# Patient Record
Sex: FEMALE | Race: WHITE | Employment: FULL TIME | URBAN - METROPOLITAN AREA
[De-identification: names, ages, dates, MRNs, and addresses within clinical notes are randomized per-mention and may not be internally consistent; named-entity substitution may affect disease eponyms.]

---

## 2024-02-21 ENCOUNTER — OFFICE VISIT (OUTPATIENT)
Dept: ORTHOPEDIC SURGERY | Age: 64
End: 2024-02-21
Payer: COMMERCIAL

## 2024-02-21 VITALS — HEIGHT: 68 IN

## 2024-02-21 DIAGNOSIS — S93.601A UNSPECIFIED SPRAIN OF RIGHT FOOT, INITIAL ENCOUNTER: Primary | ICD-10-CM

## 2024-02-21 DIAGNOSIS — M17.0 OSTEOARTHRITIS OF BOTH KNEES, UNSPECIFIED OSTEOARTHRITIS TYPE: ICD-10-CM

## 2024-02-21 DIAGNOSIS — R26.89 ABNORMALITY OF GAIT DUE TO IMPAIRMENT OF BALANCE: ICD-10-CM

## 2024-02-21 DIAGNOSIS — S92.354A NONDISPLACED FRACTURE OF FIFTH METATARSAL BONE, RIGHT FOOT, INITIAL ENCOUNTER FOR CLOSED FRACTURE: ICD-10-CM

## 2024-02-21 PROCEDURE — G8484 FLU IMMUNIZE NO ADMIN: HCPCS | Performed by: ORTHOPAEDIC SURGERY

## 2024-02-21 PROCEDURE — 3017F COLORECTAL CA SCREEN DOC REV: CPT | Performed by: ORTHOPAEDIC SURGERY

## 2024-02-21 PROCEDURE — 4004F PT TOBACCO SCREEN RCVD TLK: CPT | Performed by: ORTHOPAEDIC SURGERY

## 2024-02-21 PROCEDURE — 99204 OFFICE O/P NEW MOD 45 MIN: CPT | Performed by: ORTHOPAEDIC SURGERY

## 2024-02-21 PROCEDURE — G8421 BMI NOT CALCULATED: HCPCS | Performed by: ORTHOPAEDIC SURGERY

## 2024-02-21 PROCEDURE — M5017 MISC EVENUP: HCPCS | Performed by: ORTHOPAEDIC SURGERY

## 2024-02-21 PROCEDURE — G8427 DOCREV CUR MEDS BY ELIG CLIN: HCPCS | Performed by: ORTHOPAEDIC SURGERY

## 2024-02-21 NOTE — PROGRESS NOTES
The patient was prescribed and fitted with an even up shoe lift for the left foot, size medium.     Patient read and signed documenting they understand and agree to [facility]'s current DME return policy.   
recognition and word/phrase syntax errors.

## 2024-03-20 ENCOUNTER — OFFICE VISIT (OUTPATIENT)
Dept: ORTHOPEDIC SURGERY | Age: 64
End: 2024-03-20
Payer: COMMERCIAL

## 2024-03-20 DIAGNOSIS — R26.89 ABNORMALITY OF GAIT DUE TO IMPAIRMENT OF BALANCE: ICD-10-CM

## 2024-03-20 DIAGNOSIS — S92.351G CLOSED DISPLACED FRACTURE OF FIFTH METATARSAL BONE OF RIGHT FOOT WITH DELAYED HEALING, SUBSEQUENT ENCOUNTER: ICD-10-CM

## 2024-03-20 DIAGNOSIS — S99.921D INJURY OF RIGHT FOOT, SUBSEQUENT ENCOUNTER: Primary | ICD-10-CM

## 2024-03-20 PROCEDURE — G8421 BMI NOT CALCULATED: HCPCS | Performed by: ORTHOPAEDIC SURGERY

## 2024-03-20 PROCEDURE — 99213 OFFICE O/P EST LOW 20 MIN: CPT | Performed by: ORTHOPAEDIC SURGERY

## 2024-03-20 PROCEDURE — G8484 FLU IMMUNIZE NO ADMIN: HCPCS | Performed by: ORTHOPAEDIC SURGERY

## 2024-03-20 PROCEDURE — G8427 DOCREV CUR MEDS BY ELIG CLIN: HCPCS | Performed by: ORTHOPAEDIC SURGERY

## 2024-03-20 PROCEDURE — 4004F PT TOBACCO SCREEN RCVD TLK: CPT | Performed by: ORTHOPAEDIC SURGERY

## 2024-03-20 PROCEDURE — 3017F COLORECTAL CA SCREEN DOC REV: CPT | Performed by: ORTHOPAEDIC SURGERY

## 2024-03-20 NOTE — PROGRESS NOTES
Name: Noreen Menjivar  YOB: 1960  Gender: female  MRN: [id number]    03/20/2024: She stopped 3D boot as it was irritating the anterior shin creating Fibromyalgia symptoms    HPI:   02/14/2024: Right foot injury:  02/21/2024: Initial visit: Right foot injury    ROS/Meds/PSH/PMH/FH/SH: reviewed today    Tobacco:  has no history on file for tobacco use.     Physical Examination:  Patient appears to be alert and oriented with acceptable appearance.  No obvious distress or SOB  CV: appears to have acceptable vascular color and capillary refill  Neuro: appears to have mostly intact light touch sensation   Skin: Right = no lateral ankle swelling; less lateral foot swelling   MS: Standing: Plantigrade: Gait full in regular shoes  Right = no ankle or hindfoot pain  Right = less dorsal lateral fifth metatarsal shaft pain  Right = good ankle/foot motion: 5/5 strength; no instability or crepitance    XR: Right: Standing AP lateral mortise ankle plus AP oblique foot taken today with chronic first MTP arthritis; suspected second metatarsal head flattening; midfoot arthritis; accessory navicular; possible lateral posterolateral talar impaction injury versus arthritis; healing fifth metatarsal shaft fracture; age indeterminate 5th metatarsal tuberosity fracture   XR Impression:  As above      Reviewed Test/Records/Documents:   02/16/2024: CARLOS Vasques: Reflects nondisplaced fracture fifth metatarsal right foot    Assessment:    Right advanced midfoot arthritis  Right lateral subtalar impaction injury versus arthritis  Right acute foot injury: 5th metatarsal shaft fracture   Right prior foot injury: 5th metatarsal tuberosity acute tip fracture vs nonunion   Chronic bilateral knee arthritis  Chronic history of falling with gait station balance concerns    Plan:   The patient and I discussed the above assessment. We explored treatment options.     She reports chronic problems with her knees randomly locking   Prior

## 2024-03-21 NOTE — PROGRESS NOTES
The patient was prescribed and fitted with a carbon fiber shoe insert for the right foot, size medium.     Patient read and signed documenting they understand and agree to [facility]'s current DME return policy.

## 2024-05-01 ENCOUNTER — OFFICE VISIT (OUTPATIENT)
Dept: ORTHOPEDIC SURGERY | Age: 64
End: 2024-05-01
Payer: COMMERCIAL

## 2024-05-01 VITALS — BODY MASS INDEX: 34.86 KG/M2 | WEIGHT: 230 LBS | HEIGHT: 68 IN

## 2024-05-01 DIAGNOSIS — S99.921D INJURY OF RIGHT FOOT, SUBSEQUENT ENCOUNTER: Primary | ICD-10-CM

## 2024-05-01 DIAGNOSIS — R26.89 ABNORMALITY OF GAIT DUE TO IMPAIRMENT OF BALANCE: ICD-10-CM

## 2024-05-01 DIAGNOSIS — S92.351D CLOSED DISPLACED FRACTURE OF FIFTH METATARSAL BONE OF RIGHT FOOT WITH ROUTINE HEALING, SUBSEQUENT ENCOUNTER: ICD-10-CM

## 2024-05-01 PROCEDURE — G8419 CALC BMI OUT NRM PARAM NOF/U: HCPCS | Performed by: ORTHOPAEDIC SURGERY

## 2024-05-01 PROCEDURE — 99212 OFFICE O/P EST SF 10 MIN: CPT | Performed by: ORTHOPAEDIC SURGERY

## 2024-05-01 PROCEDURE — G8427 DOCREV CUR MEDS BY ELIG CLIN: HCPCS | Performed by: ORTHOPAEDIC SURGERY

## 2024-05-01 PROCEDURE — 3017F COLORECTAL CA SCREEN DOC REV: CPT | Performed by: ORTHOPAEDIC SURGERY

## 2024-05-01 PROCEDURE — 4004F PT TOBACCO SCREEN RCVD TLK: CPT | Performed by: ORTHOPAEDIC SURGERY

## 2024-05-01 RX ORDER — CELECOXIB 200 MG/1
200 CAPSULE ORAL DAILY
COMMUNITY

## 2024-05-01 RX ORDER — FLUTICASONE PROPIONATE 50 MCG
SPRAY, SUSPENSION (ML) NASAL
COMMUNITY

## 2024-05-01 RX ORDER — TRANDOLAPRIL TABLETS 2 MG/1
2 TABLET ORAL DAILY
COMMUNITY

## 2024-05-01 RX ORDER — MULTIVIT-MIN/IRON/FOLIC ACID/K 45-800-120
2 CAPSULE ORAL 2 TIMES DAILY
COMMUNITY

## 2024-05-01 RX ORDER — OMEPRAZOLE 10 MG/1
10 CAPSULE, DELAYED RELEASE ORAL DAILY
COMMUNITY

## 2024-05-01 RX ORDER — TOPIRAMATE 50 MG/1
50 TABLET, FILM COATED ORAL NIGHTLY
COMMUNITY

## 2024-05-01 RX ORDER — LEVOTHYROXINE SODIUM 0.05 MG/1
50 TABLET ORAL DAILY
COMMUNITY
Start: 2023-03-09

## 2024-05-01 RX ORDER — CETIRIZINE HYDROCHLORIDE 10 MG/1
10 TABLET ORAL DAILY
COMMUNITY

## 2024-05-01 RX ORDER — NYSTATIN 100000 [USP'U]/G
POWDER TOPICAL
COMMUNITY
Start: 2023-07-12 | End: 2024-07-11

## 2024-05-01 RX ORDER — FOLIC ACID 1 MG/1
1 TABLET ORAL DAILY
COMMUNITY

## 2024-05-01 RX ORDER — THIAMINE MONONITRATE (VIT B1) 100 MG
TABLET ORAL
COMMUNITY
Start: 2022-12-04

## 2024-05-01 RX ORDER — DULOXETIN HYDROCHLORIDE 60 MG/1
CAPSULE, DELAYED RELEASE ORAL
COMMUNITY
Start: 2024-03-30

## 2024-05-01 NOTE — PROGRESS NOTES
randomly locking   Prior treatment with gel injections  TKA partner consultation:  She cancelled; She plans to reschedule    She reports chronic history of falling: Prior diagnoses Fibromyalgia: Early neuropathy  Recommend she discuss with her PCP:  07/09/2024: Pending appointment Dr. August David neurology     She reports breaking her foot in the past   She was scheduled for x-rays today but the x-ray machines were on the blink  She is in regular shoes with no pain and no problems  A lot of extra WB activity due to having to take care of her mother   She reports no concerns, issues or pain  Since she is asymptomatic, no indication to return for XRs unless a problem arises    Advanced medical imaging: No indication for MRI scan or CT scan   We discussed care and the importance of continued protection protection  PT: POA PT: Gait station balance PT  Orthotic/prosthetic: No indication today for bracing    Medication - OTC meds prn: Discussed prior recommended:  Magne sports topical rub with frankincense and myrrh   Alpha Lipoic acid, Boswellia, Devil's claw, Turmeric-curcumin      Surgical discussion: No indication today for surgery  Follow up: As discussed above  Work status: Regular     This note was created using Dragon voice recognition software which may result in errors of speech and spelling recognition and word/phrase syntax errors.